# Patient Record
Sex: FEMALE | Race: ASIAN | URBAN - METROPOLITAN AREA
[De-identification: names, ages, dates, MRNs, and addresses within clinical notes are randomized per-mention and may not be internally consistent; named-entity substitution may affect disease eponyms.]

---

## 2024-03-31 ENCOUNTER — HOSPITAL ENCOUNTER (OUTPATIENT)
Age: 44
Discharge: HOME | End: 2024-03-31
Attending: EMERGENCY MEDICINE
Payer: COMMERCIAL

## 2024-03-31 VITALS
RESPIRATION RATE: 18 BRPM | HEIGHT: 61.02 IN | HEART RATE: 66 BPM | DIASTOLIC BLOOD PRESSURE: 58 MMHG | OXYGEN SATURATION: 98 % | TEMPERATURE: 98 F | SYSTOLIC BLOOD PRESSURE: 94 MMHG | BODY MASS INDEX: 20.77 KG/M2 | WEIGHT: 110 LBS

## 2024-03-31 DIAGNOSIS — H10.32 ACUTE CONJUNCTIVITIS OF LEFT EYE, UNSPECIFIED ACUTE CONJUNCTIVITIS TYPE: Primary | ICD-10-CM

## 2024-03-31 PROCEDURE — 99203 OFFICE O/P NEW LOW 30 MIN: CPT

## 2024-03-31 RX ORDER — TETRACAINE HYDROCHLORIDE 5 MG/ML
1 SOLUTION OPHTHALMIC ONCE
Status: COMPLETED | OUTPATIENT
Start: 2024-03-31 | End: 2024-03-31

## 2024-03-31 RX ORDER — TOBRAMYCIN 3 MG/ML
2 SOLUTION/ DROPS OPHTHALMIC
Qty: 1 EACH | Refills: 0 | Status: SHIPPED | OUTPATIENT
Start: 2024-03-31 | End: 2024-04-05

## 2024-03-31 NOTE — ED PROVIDER NOTES
Patient Seen in: [facility]      History     Chief Complaint   Patient presents with    Eye Problem     Stated Complaint: inflammation on left eye, discharge, red, painful    Subjective:   HPI    44-year-old female presents to the immediate care with complaints of redness and yellow drainage that she woke up with on her left.  Patient denies any fevers or chills.  No cough cold or congestion.  She did try putting a contact lens the other day.  She is unsure if the contact lenses still in her eye.  Her pain in her eye is only when she touches her upper lid.  No fevers or chills.  No nausea vomiting diarrhea.  No other acute complaints    Objective:   History reviewed. No pertinent past medical history.           History reviewed. No pertinent surgical history.             Social History     Socioeconomic History    Marital status:    Tobacco Use    Smoking status: Never    Smokeless tobacco: Never   Substance and Sexual Activity    Alcohol use: Never              Review of Systems   All other systems reviewed and are negative.      Positive for stated complaint: inflammation on left eye, discharge, red, painful  Other systems are as noted in HPI.  Constitutional and vital signs reviewed.      All other systems reviewed and negative except as noted above.    Physical Exam     ED Triage Vitals [03/31/24 0859]   BP 94/58   Pulse 66   Resp 18   Temp 97.5 °F (36.4 °C)   Temp src Temporal   SpO2 98 %   O2 Device None (Room air)       Current:BP 94/58   Pulse 66   Temp 97.5 °F (36.4 °C) (Temporal)   Resp 18   Ht 155 cm (5' 1.02\")   Wt 49.9 kg   SpO2 98%   BMI 20.77 kg/m²         Physical Exam  Vitals and nursing note reviewed. Chaperone present: Significant other in room adding to history.   Constitutional:       Appearance: Normal appearance. She is normal weight.   HENT:      Head: Normocephalic and atraumatic.      Right Ear: Tympanic membrane normal.      Left Ear: Tympanic membrane  normal.      Nose: Rhinorrhea present.      Comments: Minimal clear secretions  Eyes:      Extraocular Movements: Extraocular movements intact.      Pupils: Pupils are equal, round, and reactive to light.      Comments: No active discharge left eye, left eye injected, fluorescein negative, negative Hazel's, sharp discs   Cardiovascular:      Rate and Rhythm: Normal rate and regular rhythm.      Pulses: Normal pulses.      Heart sounds: Normal heart sounds.   Pulmonary:      Effort: Pulmonary effort is normal.      Breath sounds: Normal breath sounds.   Musculoskeletal:      Cervical back: Normal range of motion and neck supple.   Skin:     General: Skin is warm.      Capillary Refill: Capillary refill takes less than 2 seconds.   Neurological:      General: No focal deficit present.      Mental Status: She is alert and oriented to person, place, and time.              ED Course   Labs Reviewed - No data to display                   MDM      Exam consistent with conjunctivitis.  We discussed this most likely viral in etiology but there is always the potential for bacterial.  Could also be allergic but it is unilateral.  We discussed using over-the-counter saline drops to soothe her eye and she will be initiated on antibiotic eyedrops.  Patient was discharged in good condition                                   Medical Decision Making      Disposition and Plan     Clinical Impression:  1. Acute conjunctivitis of left eye, unspecified acute conjunctivitis type         Disposition:  Discharge  3/31/2024  9:24 am    Follow-up:  [facility]  [address]  [facility] [id number]  Schedule an appointment as soon as possible for a visit   If symptoms worsen          Medications Prescribed:  Discharge Medication List as of 3/31/2024  9:25 AM        START taking these medications    Details   tobramycin 0.3 % Ophthalmic Solution Apply 2 drops to eye every 2 (two) hours while awake for 5 days.,  Normal, Disp-1 each, R-0